# Patient Record
Sex: FEMALE | Race: WHITE | URBAN - METROPOLITAN AREA
[De-identification: names, ages, dates, MRNs, and addresses within clinical notes are randomized per-mention and may not be internally consistent; named-entity substitution may affect disease eponyms.]

---

## 2019-08-28 ENCOUNTER — OFFICE (OUTPATIENT)
Dept: URBAN - METROPOLITAN AREA CLINIC 4 | Facility: CLINIC | Age: 59
End: 2019-08-28
Payer: OTHER GOVERNMENT

## 2019-08-28 VITALS — HEIGHT: 64 IN | SYSTOLIC BLOOD PRESSURE: 148 MMHG | WEIGHT: 161 LBS | DIASTOLIC BLOOD PRESSURE: 71 MMHG

## 2019-08-28 DIAGNOSIS — R10.30 LOWER ABDOMINAL PAIN, UNSPECIFIED: ICD-10-CM

## 2019-08-28 DIAGNOSIS — R15.2 FECAL URGENCY: ICD-10-CM

## 2019-08-28 DIAGNOSIS — R19.7 DIARRHEA, UNSPECIFIED: ICD-10-CM

## 2019-08-28 PROCEDURE — 99213 OFFICE O/P EST LOW 20 MIN: CPT | Performed by: INTERNAL MEDICINE

## 2019-08-28 RX ORDER — HYOSCYAMINE SULFATE 0.12 MG/1
TABLET ORAL
Qty: 120 | Refills: 10 | Status: ACTIVE
Start: 2019-08-28

## 2022-04-11 ENCOUNTER — OFFICE VISIT (OUTPATIENT)
Dept: OBSTETRICS AND GYNECOLOGY | Facility: CLINIC | Age: 62
End: 2022-04-11

## 2022-04-11 VITALS
WEIGHT: 178 LBS | DIASTOLIC BLOOD PRESSURE: 82 MMHG | SYSTOLIC BLOOD PRESSURE: 130 MMHG | HEIGHT: 64 IN | BODY MASS INDEX: 30.39 KG/M2

## 2022-04-11 DIAGNOSIS — Z12.31 BREAST CANCER SCREENING BY MAMMOGRAM: ICD-10-CM

## 2022-04-11 DIAGNOSIS — N84.1 ENDOCERVICAL POLYP: ICD-10-CM

## 2022-04-11 DIAGNOSIS — Z80.49 FAMILY HISTORY OF UTERINE CANCER: ICD-10-CM

## 2022-04-11 DIAGNOSIS — Z80.0 FAMILY HISTORY OF COLON CANCER: ICD-10-CM

## 2022-04-11 DIAGNOSIS — Z01.419 WOMEN'S ANNUAL ROUTINE GYNECOLOGICAL EXAMINATION: Primary | ICD-10-CM

## 2022-04-11 DIAGNOSIS — Z80.41 FAMILY HISTORY OF OVARIAN CANCER: ICD-10-CM

## 2022-04-11 PROCEDURE — G0101 CA SCREEN;PELVIC/BREAST EXAM: HCPCS | Performed by: OBSTETRICS & GYNECOLOGY

## 2022-04-11 PROCEDURE — 3015F CERV CANCER SCREEN DOCD: CPT | Performed by: OBSTETRICS & GYNECOLOGY

## 2022-04-11 RX ORDER — IPRATROPIUM BROMIDE 42 UG/1
2 SPRAY, METERED NASAL
COMMUNITY

## 2022-04-11 RX ORDER — TRAZODONE HYDROCHLORIDE 50 MG/1
50 TABLET ORAL NIGHTLY PRN
COMMUNITY
Start: 2022-03-03

## 2022-04-11 RX ORDER — LITHIUM CARBONATE 300 MG/1
300 TABLET, FILM COATED, EXTENDED RELEASE ORAL 2 TIMES DAILY
COMMUNITY
Start: 2022-02-11

## 2022-04-11 RX ORDER — OXCARBAZEPINE 600 MG/1
600 TABLET, FILM COATED ORAL 2 TIMES DAILY
COMMUNITY
Start: 2022-02-15

## 2022-04-11 RX ORDER — CALCIUM POLYCARBOPHIL 625 MG
1250 TABLET ORAL
COMMUNITY

## 2022-04-11 RX ORDER — ATORVASTATIN CALCIUM 20 MG/1
20 TABLET, FILM COATED ORAL DAILY
COMMUNITY
Start: 2022-01-26

## 2022-04-11 RX ORDER — AMLODIPINE BESYLATE 2.5 MG/1
2.5 TABLET ORAL DAILY
COMMUNITY
Start: 2021-07-17 | End: 2022-07-17

## 2022-04-11 NOTE — PROGRESS NOTES
Gynecologic Annual Exam Note      CC- Here for annual exam    Subjective     Kike Landis is a 61 y.o. female new patient who presents for annual well woman exam and to establish care. No LMP recorded. Patient is postmenopausal.  Partner Status: Marital Status: . Desires STD Screening: no.   Current contraception: tubal ligation  The patient is happy with her current contraceptive method.      The patient does not have any complaints today.    She exercises regularly: no.  She has concerns about domestic violence: no.      OB History        0    Para   0    Term   0       0    AB   0    Living   0       SAB   0    IAB   0    Ectopic   0    Molar   0    Multiple   0    Live Births   0              Performs monthly Self-Breast Exam: no  History of abnormal Pap smear: in the past, no evalution needed  Family history of uterine, colon or ovarian cancer: yes - Brother - colon cancer, Mother - ovarian cancer  Sister  of uterine cancer  Family history of breast cancer: no  History of abnormal mammogram: no  Feelings of Anxiety or Depression: yes - Depression - managed with medication  Tobacco Usage?: Yes Kike Landis  reports that she quit smoking about 9 years ago. Her smoking use included cigarettes. She has never used smokeless tobacco.. I have educated her on the risk of diseases from using tobacco products such as cancer, COPD and heart disease.     I advised her to quit and she is not willing to quit.    I spent 3  minutes counseling the patient.            Last Pap : 6 years ago - Completed with Dr. Smith in [place]   Last Completed Pap Smear     This patient has no relevant Health Maintenance data.        Last mammogram: ~2018 Normal per pt. Completed at [facility]   Last Completed Mammogram     This patient has no relevant Health Maintenance data.        Last colonoscopy: Coming up next month. Last one was 2 years ago - Diverticulosis - completed at [place]  "[facility]   Last Completed Colonoscopy     This patient has no relevant Health Maintenance data.        The following portions of the patient's history were reviewed and updated as appropriate: allergies, current medications, past family history, past medical history, past social history and past surgical history.    Past Medical History:   Diagnosis Date   • Abnormal Pap smear of cervix     Unsure of results. Several years ago   • Bipolar disorder (HCC)    • Brain bleed (HCC) 07/2021   • Depression    • Diverticulosis    • Genital herpes    • Hyperlipidemia    • Hypertension    • JIA (obstructive sleep apnea)     Does not use a c-pap       Past Surgical History:   Procedure Laterality Date   • CEREBRAL ANGIOGRAM      x2   • COLONOSCOPY     • LAPAROSCOPIC TUBAL LIGATION  2002       Review of Systems  Review of Systems    Objective     /82 (BP Location: Left arm, Patient Position: Sitting, Cuff Size: Adult)   Ht 161.3 cm (63.5\")   Wt 80.7 kg (178 lb)   BMI 31.04 kg/m²       Physical Exam  Vitals and nursing note reviewed. Exam conducted with a chaperone present.   Constitutional:       General: She is awake.   HENT:      Head: Normocephalic and atraumatic.   Neck:      Thyroid: No thyroid mass, thyromegaly or thyroid tenderness.   Cardiovascular:      Rate and Rhythm: Normal rate.      Heart sounds: No murmur heard.    No friction rub. No gallop.   Pulmonary:      Effort: Pulmonary effort is normal.      Breath sounds: Normal breath sounds. No stridor. No wheezing, rhonchi or rales.   Chest:      Chest wall: No mass or tenderness.   Breasts:      Right: Normal. No bleeding, inverted nipple, mass, nipple discharge, skin change, tenderness, axillary adenopathy or supraclavicular adenopathy.      Left: Normal. No bleeding, inverted nipple, mass, nipple discharge, skin change, tenderness, axillary adenopathy or supraclavicular adenopathy.       Abdominal:      Palpations: Abdomen is soft.      " Tenderness: There is no guarding or rebound.      Hernia: There is no hernia in the left inguinal area or right inguinal area.   Genitourinary:     General: Normal vulva.      Pubic Area: No rash.       Labia:         Right: No rash, tenderness, lesion or injury.         Left: No rash, tenderness, lesion or injury.       Urethra: No prolapse, urethral swelling or urethral lesion.      Vagina: No foreign body. No vaginal discharge, erythema, tenderness, bleeding or lesions.      Cervix: No cervical motion tenderness, discharge, friability, lesion, erythema or cervical bleeding.      Uterus: Normal. Not deviated, not enlarged, not fixed and not tender.       Adnexa: Right adnexa normal and left adnexa normal.        Right: No mass, tenderness or fullness.          Left: No mass, tenderness or fullness.        Rectum: No external hemorrhoid.      Comments: There is a large polyp noted prolapsing from the external os, this is approximately 1 cm in diameter  Musculoskeletal:      Cervical back: Normal range of motion.   Lymphadenopathy:      Upper Body:      Right upper body: No supraclavicular or axillary adenopathy.      Left upper body: No supraclavicular or axillary adenopathy.   Skin:     General: Skin is warm.   Neurological:      Mental Status: She is alert and oriented to person, place, and time.   Psychiatric:         Mood and Affect: Mood and affect normal.         Speech: Speech normal.         Assessment     Assessment     Problem List Items Addressed This Visit        Family History    Family history of ovarian cancer    Overview     Mother diagnosed in late 80's           Relevant Orders    Ambulatory Referral to Genetic Counseling/Testing - [facility] Non-ob Transvaginal    Family history of uterine cancer    Overview     Sister  of uterine cancer at age 74           Relevant Orders    Ambulatory Referral to Genetic Counseling/Testing - [facility] Non-ob Transvaginal    Family history of  colon cancer    Overview     Brother  at age 57    Due 2022  Done with Dr. Hurst at [facility]           Relevant Orders    Ambulatory Referral to Genetic Counseling/Testing - [facility]      Other Visit Diagnoses     Women's annual routine gynecological examination    -  Primary    Relevant Orders    Pap IG, HPV-hr    Breast cancer screening by mammogram        Relevant Orders    Mammo Screening Digital Tomosynthesis Bilateral With CAD    Endocervical polyp        Relevant Orders    US Non-ob Transvaginal            Plan     Reviewed monthly self breast exams.  Instructed to call with lumps, pain, or breast discharge.    Ordered Mammogram today  RTC in 1 year or PRN with problems.  Patient noted on exam today to have a large endocervical polyp.  Patient denies any bleeding or abnormal discharge.  We did proceed with transvaginal ultrasound which does show a thickness within the endocervix.  Patient was also noted to have an endometrial thickness of 9.8 mm with some endometrial fluid noted as well as a heterogenous nodule within the endometrial cavity.  This measures 1.4 x 0.6 x 1.3 cm.  Secondary to these findings and the large polyp noted on examination and her significant family history, we plan to proceed with hysteroscopy, dilation and curettage and MyoSure polypectomy.  Patient will return for preoperative visit.      Rabia Fernandez MD  2022

## 2022-04-15 ENCOUNTER — PATIENT ROUNDING (BHMG ONLY) (OUTPATIENT)
Dept: OBSTETRICS AND GYNECOLOGY | Facility: CLINIC | Age: 62
End: 2022-04-15

## 2022-04-15 DIAGNOSIS — Z01.419 WOMEN'S ANNUAL ROUTINE GYNECOLOGICAL EXAMINATION: ICD-10-CM

## 2022-04-15 NOTE — PROGRESS NOTES
April 15, 2022    Hello, may I speak with Kike Landis?    My name is Zoya      I am  with [facility]  [address]  [place] [address] [phone].    Before we get started may I verify your date of birth? 1960    I am calling to officially welcome you to our practice and ask about your recent visit. Is this a good time to talk? Yes     Tell me about your visit with us. What things went well?  Everything went good.      We're always looking for ways to make our patients' experiences even better. Do you have recommendations on ways we may improve?  NO     Overall were you satisfied with your first visit to our practice? Yes        I appreciate you taking the time to speak with me today. Is there anything else I can do for you? No     Thank you, and have a great day.

## 2022-04-25 ENCOUNTER — OFFICE VISIT (OUTPATIENT)
Dept: OBSTETRICS AND GYNECOLOGY | Facility: CLINIC | Age: 62
End: 2022-04-25

## 2022-04-25 VITALS
BODY MASS INDEX: 30.46 KG/M2 | DIASTOLIC BLOOD PRESSURE: 70 MMHG | WEIGHT: 178.4 LBS | SYSTOLIC BLOOD PRESSURE: 118 MMHG | HEIGHT: 64 IN

## 2022-04-25 DIAGNOSIS — N84.1 ENDOCERVICAL POLYP: Primary | ICD-10-CM

## 2022-04-25 PROCEDURE — 99213 OFFICE O/P EST LOW 20 MIN: CPT | Performed by: OBSTETRICS & GYNECOLOGY

## 2022-04-25 RX ORDER — HYDROCODONE BITARTRATE AND ACETAMINOPHEN 5; 325 MG/1; MG/1
1-2 TABLET ORAL EVERY 4 HOURS PRN
Qty: 12 TABLET | Refills: 0 | Status: SHIPPED | OUTPATIENT
Start: 2022-04-25 | End: 2022-04-30

## 2022-04-25 RX ORDER — IBUPROFEN 800 MG/1
800 TABLET ORAL EVERY 8 HOURS PRN
Qty: 30 TABLET | Refills: 0 | Status: SHIPPED | OUTPATIENT
Start: 2022-04-25

## 2022-04-25 NOTE — PROGRESS NOTES
Gynecologic Preoperative Exam Note        Subjective   Kike Landis is a 61 y.o. year old  who is scheduled  for surgery due to an endocervical polyp.  She is scheduled for a hysteroscopy D&C with myosure polypectomy  at [facility].  Her surgery is scheduled for 5/3/2022 at 10:30.   Her LMP was approximately 2019.  Her birth control method is menopause,  Her BMI is 30.6.      She understands the risks of bleeding, infection, possible damage to other organ systems, including but not limited to the gastrointestinal tract and genitourinary tract.  She also understands the specific risks listed in the preop information (video, pamphlets, etc.).    She has reviewed and signed the preop consent form.    She has been instructed to have a light dinner the night before surgery, then nothing to eat or drink after midnight.  The day of surgery do not chew gum or smoke.  Remove all jewelry, nail polish, contact lenses prior to coming to the hospital.  Do not bring valuables or large sums of money with you. Patient was instructed on what time to arrive and where to check in, maps were given.  She was instructed that she will meet an Anesthesiologist and that an IV will be started to provide fluids and sedation.  The total time of procedure was discussed.  She was instructed that she will need a .      She has confirmed that she is not allergic to Latex.       Past Medical History:   Diagnosis Date   • Brain bleed (HCC) 2021   • Abnormal Pap smear of cervix     Unsure of results. Several years ago   • Bipolar disorder (HCC)    • Depression    • Diverticulosis    • Genital herpes    • Hyperlipidemia    • Hypertension    • JIA (obstructive sleep apnea)     Does not use a c-pap     Past Surgical History:   Procedure Laterality Date   • LAPAROSCOPIC TUBAL LIGATION     • CEREBRAL ANGIOGRAM      x2   • COLONOSCOPY       OB History    Para Term  AB Living   0 0 0 0 0 0   SAB IAB Ectopic Molar Multiple  "Live Births   0 0 0 0 0 0     Social History     Tobacco Use   Smoking Status Former Smoker   • Types: Cigarettes   • Quit date:    • Years since quittin.3   Smokeless Tobacco Never Used     Social History     Substance and Sexual Activity   Alcohol Use Yes   • Alcohol/week: 6.0 - 7.0 standard drinks   • Types: 6 - 7 Cans of beer per week     Social History     Substance and Sexual Activity   Drug Use Never     Prior to Admission medications    Medication Sig Start Date End Date Taking? Authorizing Provider   amLODIPine (NORVASC) 2.5 MG tablet Take 2.5 mg by mouth Daily. 21  Tracey Menendez MD   atorvastatin (LIPITOR) 20 MG tablet Take 20 mg by mouth Daily. 22   Tracey Menendez MD   Cholecalciferol 125 MCG (5000 UT) tablet dispersible Take 1 tablet by mouth Daily.    Tracey Menendez MD   ipratropium (ATROVENT) 0.06 % nasal spray 2 sprays into the nostril(s) as directed by provider. Combo medication with Flonase    Tracey Menendez MD   lithium (LITHOBID) 300 MG CR tablet Take 300 mg by mouth 2 (Two) Times a Day. 22   Tracey Menendez MD   OXcarbazepine (TRILEPTAL) 600 MG tablet Take 600 mg by mouth 2 (Two) Times a Day. 2/15/22   Tracey Menendez MD   polycarbophil 625 MG tablet tablet Take 1,250 mg by mouth.    Tracey Menendez MD   traZODone (DESYREL) 50 MG tablet Take 50 mg by mouth At Night As Needed. for sleep 3/3/22   Tracey Menenedz MD     No Known Allergies    Review of Systems      Objective   /70   Ht 162.6 cm (64\")   Wt 80.9 kg (178 lb 6.4 oz)   Breastfeeding No   BMI 30.62 kg/m²   General: well developed; well nourished  no acute distress   Heart: regular rate and rhythm, S1, S2 normal, no murmur, click, rub or gallop   Lungs: breathing is unlabored  clear to auscultation bilaterally   Abdomen: soft, non-tender; no masses   Pelvis: Not performed.        Assessment   Problems Addressed this Visit    None     Visit " Diagnoses     Endocervical polyp    -  Primary    Relevant Medications    ibuprofen (ADVIL,MOTRIN) 800 MG tablet    HYDROcodone-acetaminophen (NORCO) 5-325 MG per tablet      Diagnoses       Codes Comments    Endocervical polyp    -  Primary ICD-10-CM: N84.1  ICD-9-CM: 622.7                Plan   1. Pt with large endocervical polyp noted on exam, strong family history of cancers.  Pt currently asymptomatic, but with thickened endometrium and fluid in the cavity.  We plan to proceed with hysteroscopy, D&C and myosure polypectomy.  2. Risks of surgery were reviewed with the patient including risks of bleeding, infection, damage to other organ systems including, but not limited to GI and  tracts (bowel, bladder, blood vessels, nerves) risks of Anesthesia, as well as the risk the surgery will not produce the desired results, possible need for additional surgery, death, risk of uterine perforation.  3. Jordan has been obtained and reviewed   4. Pain Medication Consent Form has been signed.  A review regarding proper medication administration, impact on driving and working while medicated, the safety of use in pregnancy, the potential for overdose and the proper disposal and storage of controlled medications has been done with the patient.          Rabia Fernandez MD  4/25/2022

## 2022-05-01 ENCOUNTER — CLINICAL SUPPORT NO REQUIREMENTS (OUTPATIENT)
Dept: PREADMISSION TESTING | Facility: HOSPITAL | Age: 62
End: 2022-05-01

## 2022-05-01 DIAGNOSIS — Z01.818 PRE-OP TESTING: Primary | ICD-10-CM

## 2022-05-01 LAB — SARS-COV-2 RNA PNL SPEC NAA+PROBE: NOT DETECTED

## 2022-05-01 PROCEDURE — U0004 COV-19 TEST NON-CDC HGH THRU: HCPCS

## 2022-05-01 PROCEDURE — C9803 HOPD COVID-19 SPEC COLLECT: HCPCS

## 2022-05-01 PROCEDURE — U0005 INFEC AGEN DETEC AMPLI PROBE: HCPCS

## 2022-05-02 ENCOUNTER — TELEPHONE (OUTPATIENT)
Dept: OBSTETRICS AND GYNECOLOGY | Facility: CLINIC | Age: 62
End: 2022-05-02

## 2022-05-02 NOTE — TELEPHONE ENCOUNTER
Dr. Fernandez pt.    S/w pt she states she has upcoming surgery and wanted to know if she could bring 2 people with her. I advised patient that she could only bring one person with her. She v/u.

## 2022-05-03 ENCOUNTER — OUTSIDE FACILITY SERVICE (OUTPATIENT)
Dept: OBSTETRICS AND GYNECOLOGY | Facility: CLINIC | Age: 62
End: 2022-05-03

## 2022-05-03 ENCOUNTER — LAB REQUISITION (OUTPATIENT)
Dept: LAB | Facility: HOSPITAL | Age: 62
End: 2022-05-03

## 2022-05-03 DIAGNOSIS — N84.1 POLYP OF CERVIX UTERI: ICD-10-CM

## 2022-05-03 PROCEDURE — 88305 TISSUE EXAM BY PATHOLOGIST: CPT | Performed by: OBSTETRICS & GYNECOLOGY

## 2022-05-03 PROCEDURE — 58558 HYSTEROSCOPY BIOPSY: CPT | Performed by: OBSTETRICS & GYNECOLOGY

## 2022-05-04 ENCOUNTER — TELEPHONE (OUTPATIENT)
Dept: OBSTETRICS AND GYNECOLOGY | Facility: CLINIC | Age: 62
End: 2022-05-04

## 2022-05-04 LAB
CYTO UR: NORMAL
LAB AP CASE REPORT: NORMAL
LAB AP CLINICAL INFORMATION: NORMAL
PATH REPORT.FINAL DX SPEC: NORMAL
PATH REPORT.GROSS SPEC: NORMAL

## 2022-05-04 NOTE — TELEPHONE ENCOUNTER
Left message that I was calling to check on her postoperative course.  Also to review pathology.  Patient can follow-up at her postop visit or call us with any concerns or questions.

## 2022-05-16 ENCOUNTER — OFFICE VISIT (OUTPATIENT)
Dept: OBSTETRICS AND GYNECOLOGY | Facility: CLINIC | Age: 62
End: 2022-05-16

## 2022-05-16 VITALS
DIASTOLIC BLOOD PRESSURE: 78 MMHG | SYSTOLIC BLOOD PRESSURE: 124 MMHG | BODY MASS INDEX: 30.05 KG/M2 | HEIGHT: 64 IN | WEIGHT: 176 LBS

## 2022-05-16 DIAGNOSIS — Z09 POSTOPERATIVE FOLLOW-UP: Primary | ICD-10-CM

## 2022-05-16 PROCEDURE — 99212 OFFICE O/P EST SF 10 MIN: CPT | Performed by: OBSTETRICS & GYNECOLOGY

## 2022-05-16 NOTE — PROGRESS NOTES
"     OBGYN Postoperative Exam Note          Subjective   Chief Complaint   Patient presents with   • Post op     Kike Landis is a 61 y.o. year old  presenting to be seen for her post-operative visit.  Currently she reports pain is well controlled and patient has started bleeding the last couple of days.  She had light bleeding that lasted 2-3 days, used a pantyliner.  Bleeding stopped spontaneously and none currently.      The pathology results from her procedure are in Kike's record and are benign, showing simple hyperplasia without atypia in endometrial polyp.      OTHER THINGS SHE WANTS TO DISCUSS TODAY:  discomfort in stomach area  feels bloated in upper stomach    The following portions of the patient's history were reviewed and updated as appropriate:current medications and allergies       Objective   /78   Ht 162.6 cm (64\")   Wt 79.8 kg (176 lb)   BMI 30.21 kg/m²     General:  well developed; well nourished  no acute distress   Abdomen: soft, non-tender; no masses   Pelvis: Not performed.          Assessment   1. S/P hysteroscopy with Myosure     Plan   1. May return to full activity with no restrictions  2. The importance of keeping all planned follow-up and taking all medications as prescribed was emphasized.  3. Follow up for annual exam or prn     No orders of the defined types were placed in this encounter.             Rabia Fernandez MD  2022    "

## 2022-05-26 ENCOUNTER — TELEPHONE (OUTPATIENT)
Dept: OBSTETRICS AND GYNECOLOGY | Facility: CLINIC | Age: 62
End: 2022-05-26

## 2022-05-26 NOTE — TELEPHONE ENCOUNTER
Pt states she is feeling fine. Per TIFF 2 week PO note she does not need to come back and she was released to normal activity.     I spoke with KW and she agreed pt does not need to come back.    I lvm for pt letting her know that I cancelled her appt per her request

## 2022-06-06 ENCOUNTER — APPOINTMENT (OUTPATIENT)
Dept: MAMMOGRAPHY | Facility: HOSPITAL | Age: 62
End: 2022-06-06

## 2022-09-19 ENCOUNTER — TELEPHONE (OUTPATIENT)
Dept: GENETICS | Facility: HOSPITAL | Age: 62
End: 2022-09-19

## 2022-09-19 NOTE — TELEPHONE ENCOUNTER
Called patient and completed family history/progeny prior to genetic appt. Patient stated she already had some form of genetic testing done through her pcp but was not sure of the specifics. I informed the patient I would contact the office and see what I could find out before her appt.

## 2022-09-22 ENCOUNTER — CLINICAL SUPPORT (OUTPATIENT)
Dept: GENETICS | Facility: HOSPITAL | Age: 62
End: 2022-09-22

## 2022-09-22 DIAGNOSIS — Z80.41 FAMILY HISTORY OF MALIGNANT NEOPLASM OF OVARY: ICD-10-CM

## 2022-09-22 DIAGNOSIS — Z80.49 FAMILY HISTORY OF UTERINE CANCER: ICD-10-CM

## 2022-09-22 DIAGNOSIS — Z80.0 FAMILY HISTORY OF COLON CANCER: ICD-10-CM

## 2022-09-22 DIAGNOSIS — Z13.79 GENETIC TESTING: Primary | ICD-10-CM

## 2022-09-26 NOTE — PROGRESS NOTES
Kike Landis, a 61-year-old female, was referred for genetic counseling due to a family history of ovarian cancer. Genetic counseling was completed via telehealth. She was 14 years old at menarche and is nulliparous. She retains her uterus and ovaries. Her most recent mammogram was about six years ago. She has colonoscopies every five years and reports having a few benign polyps removed. She was interested in discussing her risk for a hereditary cancer syndrome.  Ms. Landis was interested in pursuing a multigene panel, and therefore the CancerNext panel was ordered through [facility] which analyzes 36 genes associated with an increased cancer risk.     FAMILY HISTORY (see attached pedigree):   Mother:   Ovarian cancer, 88  Mat. Half-Sister:  Uterine cancer, 74  Mat. Half-Brother:  Colon cancer, 56    We do not have medical records confirming the diagnoses in Ms. Landis’s family.    RISK ASSESSMENT:  Ms. Landis’s family history of cancer led to concern regarding a hereditary cancer syndrome.  She meets NCCN guidelines criteria for BRCA1/2 testing based on her family history of ovarian cancer. If genetic testing is negative, Ms. Landis’s management should be guided by family history. These risk assessments are based on the family history information provided at the time of the appointment.      GENETIC COUNSELING (30 minutes):  We reviewed the family history information in detail.  Cases of cancer follow three general patterns: sporadic, familial, and hereditary.  While most cancer is sporadic, some cases appear to occur in family clusters.  These cases are said to be familial and account for 10-20% of certain cancer cases.  Familial cases may be due to a combination of shared genes and environmental factors among family members.  In even fewer families, the cancer is said to be inherited, and the genes responsible for the cancer are known.      Family histories typical of hereditary cancer syndromes usually include  multiple first- and second-degree relatives diagnosed with cancer types that define a syndrome.  These cases tend to be diagnosed at younger-than-expected ages and can be bilateral or multifocal.  The cancer in these families follows an autosomal dominant inheritance pattern, which indicates the likely presence of a mutation in a cancer susceptibility gene.  Children and siblings of an individual believed to carry this mutation have a 50% chance of inheriting that mutation, thereby inheriting the increased risk to develop cancer.  These mutations can be passed down from the maternal or the paternal lineage.    Based on Ms. Landis’s family history, we discussed that hereditary breast cancer accounts for approximately 5-10% of all cases of breast cancer.  A significant proportion of hereditary breast and ovarian cancer can be attributed to mutations in the BRCA1 and BRCA2 genes.  Mutations in these genes confer an increased risk for breast cancer, ovarian cancer, male breast cancer, prostate cancer, and pancreatic cancer. Women with a BRCA1 or BRCA2 mutation have up to an 87% lifetime risk of breast cancer and up to a 44% risk of ovarian cancer. We also discussed Ng syndrome given her family history. We discussed that the most common form of hereditary colon cancer is Ng syndrome.  It is caused by mutations in the mismatch repair genes. The lifetime risk for colon cancer for individuals with Ng syndrome is as high as 80% if there is no intervention (i.e. removal of polyps detected on colonoscopy).  Other risks include endometrial cancer (up to 60%), as well as other cancers (ovarian, upper GI, brain, stomach, pancreatic).     There are other, more rare, hereditary cancer syndromes. Some of these conditions have well defined cancer risks and established management guidelines.  Other genes that can be tested for have been   more recently described, and there may be less data regarding the risks and therefore may  not have established management guidelines.  We discussed these limitations at length. Based on Ms. Landis’s family history and her desire to get more information regarding her personal risks she opted to pursue testing through a panel evaluating several other genes known to increase the risk for cancer.    GENETIC TESTING:  The risks, benefits and limitations of genetic testing and implications for clinical management following testing were reviewed. DNA test results can influence decisions regarding screening and prevention.  Genetic testing can have significant psychological implications for both individuals and families. Also discussed was the possibility of employment and insurance discrimination based on genetic test results and the federal and states laws that are in place to prevent this (LYLA) and the limitations of this law.         We discussed multigene panel testing, which would involve testing BRCA1/2 and an additional 34 genes associated with an increased cancer risk. The benefits and limitations of genetic testing were discussed and Ms. Landis decided to pursue testing of the genes via the panel. The implications of a positive or negative test result were discussed.  We discussed the possibility that, in some cases, genetic test results may be ambiguous due to the identification of a genetic variant. These variants may or may not be associated with an increased cancer risk. With multigene panel testing, it is not uncommon for a variant of uncertain significance (VUS) to be identified.  If a VUS is identified, testing family members is not recommended and screening recommendations are made based on the family history.  The laboratories that perform genetic testing work to reclassify the VUS and send out an amended report if and when a VUS is reclassified.  The majority of variant findings are ultimately reclassified to a negative result. Given her family history, a negative test result does not  eliminate all cancer risk, although the risk would not be as high as it would with positive genetic testing.     PLAN:  Genetic testing via the CancerNext panel through [facility] was ordered. A saliva kit will be sent to Ms. Landis’s home and results are expected 2-3 weeks after the lab receives her sample.       Carolina Lance MS, [facility], [facility]  Licensed Certified Genetic Counselor

## 2022-10-19 ENCOUNTER — TELEPHONE (OUTPATIENT)
Dept: GENETICS | Facility: HOSPITAL | Age: 62
End: 2022-10-19

## 2022-10-20 ENCOUNTER — DOCUMENTATION (OUTPATIENT)
Dept: GENETICS | Facility: HOSPITAL | Age: 62
End: 2022-10-20

## 2022-10-20 ENCOUNTER — TELEPHONE (OUTPATIENT)
Dept: GENETICS | Facility: HOSPITAL | Age: 62
End: 2022-10-20

## 2022-10-20 NOTE — TELEPHONE ENCOUNTER
Patient returned my call and I disclosed her negative genetic testing results. Informed patient these results would be mailed to her and sent to her Dr's office.

## 2022-10-24 NOTE — PROGRESS NOTES
Kike Landis, a 61-year-old female, was referred for genetic counseling due to a family history of ovarian cancer. Genetic counseling was completed via telehealth. She was 14 years old at menarche and is nulliparous. She retains her uterus and ovaries. Her most recent mammogram was about six years ago. She has colonoscopies every five years and reports having a few benign polyps removed. She was interested in discussing her risk for a hereditary cancer syndrome.  Ms. Landis was interested in pursuing a multigene panel, and therefore the CancerNext panel was ordered through [facility] which analyzes 36 genes associated with an increased cancer risk. The genes on this panel include APC, SANDRA, AXIN2, BARD1, BMPR1A, BRCA1, BRCA2, BRIP1, CDH1, CDK4, CDKN2A, CHEK2, DICER1, EPCAM, GREM1, HOXB13, MLH1, MSH2, MSH3, MSH6, MUTYH, NBN, NF1, NTHL1, PALB2, PMS2, POLD1, POLE, PTEN, RAD51C, RAD51D, RECQL, SMAD4, SMARCA4, STK11, and TP53.  Genetic testing was negative for pathogenic mutations in BRCA1/2 and 34 additional genes included on the CancerNext panel. These normal results were discussed with Ms. Landis by telephone on 10/20/2022.    FAMILY HISTORY (see attached pedigree):   Mother:  Ovarian cancer, 88  Mat. Half-Sister: Uterine cancer, 74  Mat. Half-Brother: Colon cancer, 56  Father:   Bladder cancer, 83    We do not have medical records confirming the diagnoses in Ms. Landis’s family.    RISK ASSESSMENT:  Ms. Landis’s family history of cancer led to concern regarding a hereditary cancer syndrome.  She meets NCCN guidelines criteria for BRCA1/2 testing based on her family history of ovarian cancer. If genetic testing is negative, Ms. Landis’s management should be guided by family history.     At this time, Ms. Landis’s management should be guided by a family history-based risk assessment. Solangeer-Rolfzick, version 8 is able to take into account personal factors (age at menarche, parity, etc) and family history when calculating risk  for breast cancer.  Computer modeling estimates that Ms. Landis’s lifetime personal risk for developing breast cancer is 8% (Shanita, v8), compared to the average 59-year-old female’s lifetime risk of 7.5%. In general, a lifetime risk above 20% is considered to be “high risk” where increased screening is warranted; Ms. Landis’s risk does not fall into that category. This risk assessment is based on the family history information provided at the time of the appointment and could change in the future should new information be obtained.    GENETIC COUNSELING:  We reviewed the family history information in detail.  Cases of cancer follow three general patterns: sporadic, familial, and hereditary.  While most cancer is sporadic, some cases appear to occur in family clusters.  These cases are said to be familial and account for 10-20% of certain cancer cases.  Familial cases may be due to a combination of shared genes and environmental factors among family members.  In even fewer families, the cancer is said to be inherited, and the genes responsible for the cancer are known.      Family histories typical of hereditary cancer syndromes usually include multiple first- and second-degree relatives diagnosed with cancer types that define a syndrome.  These cases tend to be diagnosed at younger-than-expected ages and can be bilateral or multifocal.  The cancer in these families follows an autosomal dominant inheritance pattern, which indicates the likely presence of a mutation in a cancer susceptibility gene.  Children and siblings of an individual believed to carry this mutation have a 50% chance of inheriting that mutation, thereby inheriting the increased risk to develop cancer.  These mutations can be passed down from the maternal or the paternal lineage.    Based on Ms. Landis’s family history, we discussed that hereditary breast cancer accounts for approximately 5-10% of all cases of breast cancer.  A significant  proportion of hereditary breast and ovarian cancer can be attributed to mutations in the BRCA1 and BRCA2 genes.  Mutations in these genes confer an increased risk for breast cancer, ovarian cancer, male breast cancer, prostate cancer, and pancreatic cancer. Women with a BRCA1 or BRCA2 mutation have up to an 87% lifetime risk of breast cancer and up to a 44% risk of ovarian cancer. We also discussed Ng syndrome given her family history. We discussed that the most common form of hereditary colon cancer is Ng syndrome.  It is caused by mutations in the mismatch repair genes. The lifetime risk for colon cancer for individuals with Ng syndrome is as high as 80% if there is no intervention (i.e. removal of polyps detected on colonoscopy).  Other risks include endometrial cancer (up to 60%), as well as other cancers (ovarian, upper GI, brain, stomach, pancreatic).     There are other, more rare, hereditary cancer syndromes. Some of these conditions have well defined cancer risks and established management guidelines.  Other genes that can be tested for have been   more recently described, and there may be less data regarding the risks and therefore may not have established management guidelines.  We discussed these limitations at length. Based on Ms. Landis’s family history and her desire to get more information regarding her personal risks she opted to pursue testing through a panel evaluating several other genes known to increase the risk for cancer.    GENETIC TESTING:  The risks, benefits and limitations of genetic testing and implications for clinical management following testing were reviewed. DNA test results can influence decisions regarding screening and prevention.  Genetic testing can have significant psychological implications for both individuals and families. Also discussed was the possibility of employment and insurance discrimination based on genetic test results and the federal and states laws that  are in place to prevent this (LYLA) and the limitations of this law.         We discussed multigene panel testing, which would involve testing BRCA1/2 and an additional 34 genes associated with an increased cancer risk. The benefits and limitations of genetic testing were discussed and Ms. Landis decided to pursue testing of the genes via the panel. The implications of a positive or negative test result were discussed.  We discussed the possibility that, in some cases, genetic test results may be ambiguous due to the identification of a genetic variant. These variants may or may not be associated with an increased cancer risk. With multigene panel testing, it is not uncommon for a variant of uncertain significance (VUS) to be identified.  If a VUS is identified, testing family members is not recommended and screening recommendations are made based on the family history.  The laboratories that perform genetic testing work to reclassify the VUS and send out an amended report if and when a VUS is reclassified.  The majority of variant findings are ultimately reclassified to a negative result. Given her family history, a negative test result does not eliminate all cancer risk, although the risk would not be as high as it would with positive genetic testing.     TEST RESULTS:  Genetic testing was negative for pathogenic mutations by sequencing and rearrangement testing for the 36 genes on the CancerNext panel.  The negative result greatly lowers the risk of a hereditary cancer syndrome for Ms. Landis.  It is possible that the family history is due to a hereditary cancer syndrome which Ms. Landis did not happen to inherit. This assessment is based on the information provided at the time of the consultation.    CANCER PREVENTION:  Options available to individuals with an elevated lifetime risk for breast and/or ovarian cancer were briefly discussed.  Based on computer modeling, Ms. Landis’s lifetime risk for breast cancer  would not be considered “high risk” (>20%).   Per NCCN guidelines, it is appropriate for her to follow general population screening guidelines for her breast cancer risk including annual clinical breast exam and annual mammography.     Current NCCN guidelines recommend that individuals who have a first-degree relative diagnosed with colorectal cancer at any age should begin colonoscopy screening at age 40 or ten years before the earliest diagnosis of colorectal cancer in the family, whichever is earliest, and repeat every five years or more frequently based on personal clinical findings. This assessment is based on the information provided at the time of the consultation and could change should new information become available regarding the family history.     PLAN:  Genetic counseling remains available to Ms. Landis. If she has any questions or concerns, she is welcome to contact us at [phone].      Carolina Lance MS, [facility], [facility]  Licensed Certified Genetic Counselor      Cc: Kike Fernandez MD